# Patient Record
Sex: MALE | Race: WHITE | NOT HISPANIC OR LATINO
[De-identification: names, ages, dates, MRNs, and addresses within clinical notes are randomized per-mention and may not be internally consistent; named-entity substitution may affect disease eponyms.]

---

## 2019-07-25 PROBLEM — Z00.00 ENCOUNTER FOR PREVENTIVE HEALTH EXAMINATION: Status: ACTIVE | Noted: 2019-07-25

## 2019-07-29 ENCOUNTER — APPOINTMENT (OUTPATIENT)
Dept: ORTHOPEDIC SURGERY | Facility: CLINIC | Age: 74
End: 2019-07-29
Payer: COMMERCIAL

## 2019-07-29 VITALS — BODY MASS INDEX: 23.22 KG/M2 | WEIGHT: 136 LBS | HEIGHT: 64 IN | RESPIRATION RATE: 16 BRPM

## 2019-07-29 DIAGNOSIS — Z87.891 PERSONAL HISTORY OF NICOTINE DEPENDENCE: ICD-10-CM

## 2019-07-29 DIAGNOSIS — S69.92XD UNSPECIFIED INJURY OF LEFT WRIST, HAND AND FINGER(S), SUBSEQUENT ENCOUNTER: ICD-10-CM

## 2019-07-29 DIAGNOSIS — Z87.438 PERSONAL HISTORY OF OTHER DISEASES OF MALE GENITAL ORGANS: ICD-10-CM

## 2019-07-29 PROCEDURE — 73110 X-RAY EXAM OF WRIST: CPT | Mod: 50

## 2019-07-29 PROCEDURE — 99203 OFFICE O/P NEW LOW 30 MIN: CPT

## 2019-07-30 ENCOUNTER — APPOINTMENT (OUTPATIENT)
Dept: ORTHOPEDIC SURGERY | Facility: CLINIC | Age: 74
End: 2019-07-30

## 2019-08-05 ENCOUNTER — OTHER (OUTPATIENT)
Age: 74
End: 2019-08-05

## 2019-08-05 ENCOUNTER — MOBILE ON CALL (OUTPATIENT)
Age: 74
End: 2019-08-05

## 2019-09-03 ENCOUNTER — APPOINTMENT (OUTPATIENT)
Dept: ORTHOPEDIC SURGERY | Facility: CLINIC | Age: 74
End: 2019-09-03
Payer: COMMERCIAL

## 2019-09-03 VITALS — HEIGHT: 64 IN | BODY MASS INDEX: 23.22 KG/M2 | RESPIRATION RATE: 16 BRPM | WEIGHT: 136 LBS

## 2019-09-03 DIAGNOSIS — S62.032D: ICD-10-CM

## 2019-09-03 PROCEDURE — 99214 OFFICE O/P EST MOD 30 MIN: CPT

## 2019-09-03 PROCEDURE — 73110 X-RAY EXAM OF WRIST: CPT | Mod: LT

## 2019-09-17 ENCOUNTER — OTHER (OUTPATIENT)
Age: 74
End: 2019-09-17

## 2019-09-17 ENCOUNTER — MOBILE ON CALL (OUTPATIENT)
Age: 74
End: 2019-09-17

## 2019-10-01 ENCOUNTER — MOBILE ON CALL (OUTPATIENT)
Age: 74
End: 2019-10-01

## 2019-11-06 ENCOUNTER — RX RENEWAL (OUTPATIENT)
Age: 74
End: 2019-11-06

## 2020-05-12 ENCOUNTER — APPOINTMENT (OUTPATIENT)
Dept: UROLOGY | Facility: CLINIC | Age: 75
End: 2020-05-12

## 2020-09-08 ENCOUNTER — APPOINTMENT (OUTPATIENT)
Dept: UROLOGY | Facility: CLINIC | Age: 75
End: 2020-09-08
Payer: COMMERCIAL

## 2020-09-08 VITALS — DIASTOLIC BLOOD PRESSURE: 74 MMHG | SYSTOLIC BLOOD PRESSURE: 137 MMHG | TEMPERATURE: 97.5 F

## 2020-09-08 PROCEDURE — 99213 OFFICE O/P EST LOW 20 MIN: CPT

## 2020-09-08 NOTE — PHYSICAL EXAM
[General Appearance - Well Nourished] : well nourished [General Appearance - Well Developed] : well developed [Normal Appearance] : normal appearance [Well Groomed] : well groomed [General Appearance - In No Acute Distress] : no acute distress [Heart Rate And Rhythm] : Heart rate and rhythm were normal [] : no respiratory distress [Abdomen Soft] : soft [Abdomen Tenderness] : non-tender [Costovertebral Angle Tenderness] : no ~M costovertebral angle tenderness [Normal Station and Gait] : the gait and station were normal for the patient's age [No Focal Deficits] : no focal deficits [Skin Color & Pigmentation] : normal skin color and pigmentation [Oriented To Time, Place, And Person] : oriented to person, place, and time [Affect] : the affect was normal [Mood] : the mood was normal [Not Anxious] : not anxious [Inguinal Lymph Nodes Enlarged Bilaterally] : inguinal [Urethral Meatus] : meatus normal [Penis Abnormality] : normal circumcised penis [Scrotum] : the scrotum was normal [Testes Tenderness] : no tenderness of the testes [Epididymis] : the epididymides were normal [Testes Mass (___cm)] : there were no testicular masses [Rectal Exam - Rectum] : digital rectal exam was normal [Prostate Enlargement] : the prostate was not enlarged [Prostate Tenderness] : the prostate was not tender [Prostate Size ___ gm] : prostate size [unfilled] gm

## 2020-09-09 ENCOUNTER — APPOINTMENT (OUTPATIENT)
Dept: UROLOGY | Facility: CLINIC | Age: 75
End: 2020-09-09

## 2020-09-09 LAB
APPEARANCE: CLEAR
BACTERIA: NEGATIVE
BILIRUBIN URINE: NEGATIVE
BLOOD URINE: NEGATIVE
COLOR: YELLOW
GLUCOSE QUALITATIVE U: NEGATIVE
HYALINE CASTS: 0 /LPF
KETONES URINE: NEGATIVE
LEUKOCYTE ESTERASE URINE: NEGATIVE
MICROSCOPIC-UA: NORMAL
NITRITE URINE: NEGATIVE
PH URINE: 5.5
PROTEIN URINE: NEGATIVE
RED BLOOD CELLS URINE: 2 /HPF
SPECIFIC GRAVITY URINE: 1.03
SQUAMOUS EPITHELIAL CELLS: 0 /HPF
UROBILINOGEN URINE: NORMAL
WHITE BLOOD CELLS URINE: 0 /HPF

## 2020-09-09 NOTE — HISTORY OF PRESENT ILLNESS
[FreeTextEntry1] : Dear Dr. Casi Abdullahi\par \par Thank you so much for the referral to help care for your patient.\par \par Chief Complaint: BPH with HOUSTON\par Date of first visit: 09/08/2020\par \par RADHA HOANG  is a 75 year old  man, who presents for annual follow up.\par \par He is a prior patient of Dr. Abdullahi, he was seen last in 2019.\par \par He has moderate BPH, and he continues on Tamsulosin 0.4 mg, now has been for 2 yrs, effective\par Nocturia x 1, daytime is stable, easier during the day.\par \par Hx of elevated PSAs with prior negative prostate biopsies, all negative.\par \par PSA Hx:\par 2.65 06/2019\par 5.4 02/2018\par 2.78  09/2016\par \par PCA3: 62 in 2016, 59 in 2019. 4K 1%\par Flow 09/08/20: Peak 13.7 ml/s, Ave 6.1 ml/s,  mL. PVR 4 mL.\par \par 09/06/2020 \par IPSS 3 QOL 3 \par LIOR 1\par \par The patient denies fevers, chills, nausea and or vomiting and no unexplained weight loss. No UTIs or prostatitis.\par \par All pertinent laboratory, films and physician notes were reviewed.  Questionnaire results were discussed with patient.

## 2020-09-09 NOTE — ASSESSMENT
[FreeTextEntry1] : In summary, 75 year old, , mild BPH on Tamsulosin 0.4 mg, worse urination in am, prior negative prostate biopsies, all negative, PSA 2.65 06/2019, PCA3: 59 in 2019. 4K 1%, Flow normal, PVR 4 mL\par \par 1. Continue Flomax 0.4 mg daily\par 2. Follow up in 6 months\par \par Thank you very much for allowing me to assist in the care of this patient. Should you have any additional questions or concerns please do not hesitate to contact me.\par \par Sincerely,\par \par Jj Finch D.O.\par  of Urology and Radiology\par  of Urology at [facility]\par System Director for Prostate Cancer\par [address] [place] [address], [address]\par Phone: [phone]\par

## 2020-09-10 LAB — BACTERIA UR CULT: NORMAL

## 2021-03-09 ENCOUNTER — APPOINTMENT (OUTPATIENT)
Dept: UROLOGY | Facility: CLINIC | Age: 76
End: 2021-03-09
Payer: COMMERCIAL

## 2021-03-09 VITALS — TEMPERATURE: 98.1 F | HEART RATE: 81 BPM | SYSTOLIC BLOOD PRESSURE: 118 MMHG | DIASTOLIC BLOOD PRESSURE: 67 MMHG

## 2021-03-09 PROCEDURE — 99072 ADDL SUPL MATRL&STAF TM PHE: CPT

## 2021-03-09 PROCEDURE — 99213 OFFICE O/P EST LOW 20 MIN: CPT

## 2021-03-09 NOTE — HISTORY OF PRESENT ILLNESS
[FreeTextEntry1] : Dear Dr. Casi Abdullahi\par \par Thank you so much for the referral to help care for your patient.\par \par Chief Complaint: BPH with HOUSTON\par Date of first visit: 09/08/2020\par \par RADHA HOANG  is a 75 year old  man with Hx of BPH with elevated PSA and prior negative prostate biopsies. He has moderate BPH and continues to take Flomax 0.4mg with worsening symptoms. His biggest complaint is weak FOS. Nocturia x1, + freq, urgency, intermittency. \par \par Also complaining of ED. He has no cardiac history and reports he still has weak morning erections.\par \par PSA Hx:\par 2.65 06/2019\par 5.4 02/2018\par 2.78  09/2016\par \par PCA3: 62 in 2016, 59 in 2019. 4K 1%\par Flow 09/08/20: Peak 13.7 ml/s, Ave 6.1 ml/s,  mL. PVR 4 mL.\par Flow 3/9/21: Peak 11.3 ml/s, avg 5.5 ml/s, vv 69cc. bell shaped curve. PVR 0mL\par \par 3/9/2021 \par IPSS 8  QOL 2\par LIOR 2\par \par 09/06/2020 \par IPSS 3 QOL 3 \par LIOR 1\par \par The patient denies fevers, chills, nausea and or vomiting and no unexplained weight loss. No UTIs or prostatitis.\par \par All pertinent laboratory, films and physician notes were reviewed.  Questionnaire results were discussed with patient. [Dysuria] : no dysuria [Hematuria - Gross] : no gross hematuria

## 2021-03-09 NOTE — PHYSICAL EXAM
[General Appearance - Well Developed] : well developed [General Appearance - Well Nourished] : well nourished [Normal Appearance] : normal appearance [Well Groomed] : well groomed [General Appearance - In No Acute Distress] : no acute distress [Abdomen Soft] : soft [Abdomen Tenderness] : non-tender [Costovertebral Angle Tenderness] : no ~M costovertebral angle tenderness [Urethral Meatus] : meatus normal [Penis Abnormality] : normal circumcised penis [Scrotum] : the scrotum was normal [Epididymis] : the epididymides were normal [Testes Tenderness] : no tenderness of the testes [Testes Mass (___cm)] : there were no testicular masses [Rectal Exam - Rectum] : digital rectal exam was normal [Prostate Enlargement] : the prostate was not enlarged [Prostate Tenderness] : the prostate was not tender [Prostate Size ___ gm] : prostate size [unfilled] gm [Skin Color & Pigmentation] : normal skin color and pigmentation [] : no respiratory distress [Oriented To Time, Place, And Person] : oriented to person, place, and time [Affect] : the affect was normal [Mood] : the mood was normal [Not Anxious] : not anxious [Normal Station and Gait] : the gait and station were normal for the patient's age [No Focal Deficits] : no focal deficits [Inguinal Lymph Nodes Enlarged Bilaterally] : inguinal [Edema] : no peripheral edema

## 2021-03-09 NOTE — ASSESSMENT
[FreeTextEntry1] : In summary, 75 year old, , mild BPH on Tamsulosin 0.4 mg with worsening urinary symptoms, prior negative prostate biopsies, all negative, PSA 2.65 06/2019, PCA3: 59 in 2019. 4K 1%, Flow normal, PVR 0 mL. Also complaining of ED.\par \par 1. Switch Flomax to Uroxatral\par 2. UA, UCx. Pt will drop off urine later today\par 3. Viagra sent to [facility]. Pt instructed to separate alpha blocker and sildenafil by 4-6 hours.\par 4. Follow up 1 month\par \par Based on the patient's history, he was prescribed Sildenafil\par \par The patient understands that the risks of this medication include facial redness, flushing, GERD, back pain, priapism, chest pain/MI, dizziness, drop in BP, loss of vision, blurry vision and loss of hearing. He has no history of unstable angina, SOB on exertion and or chest pain.\par \par The patient has been screened for potential medication interactions. He is not on any po antifungals or HIV meds. \par \par I recommended that the patient take the first dose by himself. He knows that the medication may take up to 45 minutes to work (or longer on a full stomach) and requires sexual stimulation. He will come to the ER for priapism, chest pain, or loss of vision or hearing. The patient understood all of these instructions. He will follow up in 1 month\par \par Prescription was sent to the pharmacy. \par  \par Take pill only on days when you want an erection. The pill should be taken at least an hour prior to attempting to initiating erections. Avoid food for an hour before and after taking the pill, since food will interfere with absorption and the pill will be less effective. Once the pill is working, you have a window of 10-12 hours during which it should help you but only if you are sexually stimulated. The most common side effects (not experienced by everyone) are headache, facial flushing, sensation that your heart is pounding, nasal congestion, and blue-tinted vision. You can take an ibuprofen if needed. These side effects are self-limiting and will pass. Most men who have side effects notice that they significantly diminish with repeated use of the medication. If you have an erection lasting 2 hours (in the absence of sexual stimulation) take over the counter sudafed to counteract it. At 3 hours, you should head to the Emergency Department. \par \par \par Thank you very much for allowing me to assist in the care of this patient. Should you have any additional questions or concerns please do not hesitate to contact me.\par \par Sincerely,\par \par Jj Finch D.O.\par  of Urology and Radiology\par  of Urology at [facility]\par System Director for Prostate Cancer\par [address] [place] [address], [address]\par Phone: [phone]\par

## 2021-03-10 LAB
APPEARANCE: CLEAR
BACTERIA: NEGATIVE
BILIRUBIN URINE: NEGATIVE
BLOOD URINE: NEGATIVE
COLOR: YELLOW
GLUCOSE QUALITATIVE U: NEGATIVE
HYALINE CASTS: 1 /LPF
KETONES URINE: NEGATIVE
LEUKOCYTE ESTERASE URINE: NEGATIVE
MICROSCOPIC-UA: NORMAL
NITRITE URINE: NEGATIVE
PH URINE: 5.5
PROTEIN URINE: NEGATIVE
RED BLOOD CELLS URINE: 1 /HPF
SPECIFIC GRAVITY URINE: 1.03
SQUAMOUS EPITHELIAL CELLS: 0 /HPF
UROBILINOGEN URINE: NORMAL
WHITE BLOOD CELLS URINE: 0 /HPF

## 2021-03-11 ENCOUNTER — NON-APPOINTMENT (OUTPATIENT)
Age: 76
End: 2021-03-11

## 2021-03-11 LAB — BACTERIA UR CULT: NORMAL

## 2021-04-19 ENCOUNTER — APPOINTMENT (OUTPATIENT)
Dept: UROLOGY | Facility: CLINIC | Age: 76
End: 2021-04-19
Payer: COMMERCIAL

## 2021-04-19 ENCOUNTER — APPOINTMENT (OUTPATIENT)
Dept: UROLOGY | Facility: CLINIC | Age: 76
End: 2021-04-19

## 2021-04-19 VITALS — TEMPERATURE: 97.8 F | SYSTOLIC BLOOD PRESSURE: 115 MMHG | HEART RATE: 75 BPM | DIASTOLIC BLOOD PRESSURE: 72 MMHG

## 2021-04-19 PROCEDURE — 99072 ADDL SUPL MATRL&STAF TM PHE: CPT

## 2021-04-19 PROCEDURE — 99213 OFFICE O/P EST LOW 20 MIN: CPT

## 2021-04-19 RX ORDER — TAMSULOSIN HYDROCHLORIDE 0.4 MG/1
0.4 CAPSULE ORAL
Qty: 90 | Refills: 0 | Status: COMPLETED | COMMUNITY
End: 2021-04-19

## 2021-04-19 NOTE — ASSESSMENT
[FreeTextEntry1] : In summary, 75 year old, , mild BPH on Uroxtral x1 month after changing from Flomax with slight improvement. Prior negative prostate biopsies-all negative, PSA 2.65 06/2019, PCA3: 59 in 2019. 4K 1%, Flow normal, PVR 8 mL. \par \par 1. Continue Uroxatral\par 2. Continue Viagra. Pt instructed to separate alpha blocker and sildenafil by 4-6 hours.\par 3. Follow up 6 months\par \par Thank you very much for allowing me to assist in the care of this patient. Should you have any additional questions or concerns please do not hesitate to contact me.\par \par Sincerely,\par \par Jj Finch D.O.\par  of Urology and Radiology\par  of Urology at [facility]\par System Director for Prostate Cancer\par [address] [place] [address], [address]\par Phone: [phone]\par

## 2021-04-19 NOTE — PHYSICAL EXAM
[General Appearance - Well Developed] : well developed [General Appearance - Well Nourished] : well nourished [Normal Appearance] : normal appearance [Well Groomed] : well groomed [General Appearance - In No Acute Distress] : no acute distress [Abdomen Soft] : soft [Abdomen Tenderness] : non-tender [Costovertebral Angle Tenderness] : no ~M costovertebral angle tenderness [Urethral Meatus] : meatus normal [Penis Abnormality] : normal circumcised penis [Scrotum] : the scrotum was normal [Testes Tenderness] : no tenderness of the testes [Testes Mass (___cm)] : there were no testicular masses [Prostate Tenderness] : the prostate was not tender [Prostate Size ___ gm] : prostate size [unfilled] gm [Skin Color & Pigmentation] : normal skin color and pigmentation [Edema] : no peripheral edema [Oriented To Time, Place, And Person] : oriented to person, place, and time [Affect] : the affect was normal [Mood] : the mood was normal [Not Anxious] : not anxious [Normal Station and Gait] : the gait and station were normal for the patient's age [No Focal Deficits] : no focal deficits [Inguinal Lymph Nodes Enlarged Bilaterally] : inguinal [] : no hepato-splenomegaly [Abdomen Hernia] : no hernia was discovered [No Prostate Nodules] : no prostate nodules

## 2021-09-08 ENCOUNTER — APPOINTMENT (OUTPATIENT)
Dept: ORTHOPEDIC SURGERY | Facility: CLINIC | Age: 76
End: 2021-09-08
Payer: COMMERCIAL

## 2021-09-08 VITALS — WEIGHT: 136 LBS | HEIGHT: 64 IN | BODY MASS INDEX: 23.22 KG/M2 | RESPIRATION RATE: 16 BRPM

## 2021-09-08 PROCEDURE — 73140 X-RAY EXAM OF FINGER(S): CPT | Mod: F3

## 2021-09-08 PROCEDURE — 99213 OFFICE O/P EST LOW 20 MIN: CPT | Mod: 25

## 2021-09-08 PROCEDURE — 20550 NJX 1 TENDON SHEATH/LIGAMENT: CPT | Mod: F3

## 2021-09-08 NOTE — HISTORY OF PRESENT ILLNESS
[Ambidextrous] : ambidextrous [FreeTextEntry1] : Patient here for new complaint of left ring TF. Patient complains of pain, stiffness, and locking of the joint. Denies any numbness or tingling.\par

## 2021-09-08 NOTE — REVIEW OF SYSTEMS
[Ambidextrous] : ambidextrous [Arthralgia] : arthralgia [Joint Pain] : joint pain [Joint Stiffness] : joint stiffness [Joint Swelling] : joint swelling [Negative] : Allergic/Immunologic

## 2021-09-08 NOTE — PHYSICAL EXAM
[de-identified] : Physical exam shows the patient to be alert and oriented x3, capable of ambulation. The patient is well-developed and well-nourished in no apparent respiratory distress. Majority of the skin is intact bilaterally in the upper extremities without lymphadenopathy at the elbows.\par \par The wrists have a symmetric range of motion bilaterally. There is no tenderness over the scaphoid, scapholunate or lunotriquetral ligaments bilaterally. There is a negative Wills test bilaterally. There is negative tenderness over the radial ulnar joint or TFCC and no evidence of instability bilaterally. No tenderness over the pisotriquetral or hamate hook or CMC joint bilaterally. There is 5 over 5 strength of the wrists bilaterally.\par \par There is positive swelling and tenderness localized to the A1 pulley of the left fourth digit with locking upon compression of the pulley.. There is no evidence of infection. No tenderness of the MP, PIP or DIP joint and no evidence of instability bilaterally. The FDS FDP and extensor mechanism is intact without instability and with 5 over 5 strength bilaterally in the digits.\par \par There is good capillary refill of the digits bilaterally.There is no masses or sensitivity over the median and ulnar nerves at the level of the wrist. There is a negative Tinel's and negative Phalen's sign bilaterally. The sensation is grossly intact bilaterally. [de-identified] : PA lateral and oblique the left fourth digit shows excellent alignment with minimal degenerative changes and joint space is well-preserved

## 2021-09-08 NOTE — ASSESSMENT
[FreeTextEntry1] : Left fourth trigger finger\par \par My impression is that this patient has stenosing tenosynovitis of the left fourth finger, more comonly known as a trigger finger.  \par \par The risks benefits and alternatives of treatment were discussed ranging from conservative to aggressive forms of treatment.  This included (but was not limited to) pain, infection, subcutaneous atrophy, skin depigmentattion, tendon rupture, recurrence, incomplete relief of symptoms, tissue loss etc...  They agreed to undergo the steroid injection. \par \par Under informed consent and sterile conditions, 1/2 cc of 2% plain lidocaine  and 1/2 cc of Kenalog 10 was precisely injected into the patient's finger.   A sterile Band-Aid was placed and a home program was elected over occupational therapy. \par \par  It is my hope that this significantly alleviates the patient's symptoms. If symptoms are not fully resolved in the next 4-6 weeks they were encouraged to return to the office for reevaluation. Patient may also consider other forms of conservative care or surgical decompression which was discussed in the office today.\par \par  If symptoms are resolved they can followup on a as-needed basis.\par

## 2021-09-08 NOTE — REASON FOR VISIT
[Trigger Finger] : trigger finger [Follow-Up Visit] : a follow-up visit for [FreeTextEntry2] : Left ring

## 2021-10-18 ENCOUNTER — APPOINTMENT (OUTPATIENT)
Dept: UROLOGY | Facility: CLINIC | Age: 76
End: 2021-10-18
Payer: COMMERCIAL

## 2021-10-18 VITALS
TEMPERATURE: 98 F | BODY MASS INDEX: 23.22 KG/M2 | HEIGHT: 64 IN | HEART RATE: 73 BPM | SYSTOLIC BLOOD PRESSURE: 117 MMHG | WEIGHT: 136 LBS | DIASTOLIC BLOOD PRESSURE: 67 MMHG

## 2021-10-18 PROCEDURE — 99213 OFFICE O/P EST LOW 20 MIN: CPT

## 2021-10-21 NOTE — HISTORY OF PRESENT ILLNESS
[FreeTextEntry1] : Dear Dr. Casi Abdullahi\par \par Thank you so much for the referral to help care for your patient.\par \par Chief Complaint: BPH with HOUSTON, elevated PSA\par Date of first visit: 09/08/2020\par \par RADHA HOANG  is a 76 year old  man with Hx of BPH with elevated PSA and prior negative prostate biopsies. He has been on uroxatral for the past 7 months (switched from Flomax due to worsening BPH symptoms). Biggest complaint is still weak FOS-only with his first void in the morning. His last PSA is 2.6 ng/mL in 2019. He has had four negative biopsies, last one 2019. Denies family hx of prostate cancer. KRISTOPHER has been negative.\par \par Also complaining of ED. He has no cardiac history and reports he still has weak morning erections. Was started on Viagra and sees some improvement with this. \par \par PSA Hx:\par 2.65 06/2019\par 5.4 02/2018\par 2.78  09/2016\par \par PCA3: 62 in 2016, 59 in 2019. 4K 1%\par \par 10/18/2021\par IPSS 7 QOL 2\par LIOR 12\par Flow/PVR: Peak 12.1 ml/s, avg 7.2 ml/s, vv 63-not valid. PVR 0cc\par \par 4/19/2021\par IPSS 5 QOL 3\par LIOR 2\par Flow 4/19/21: Peak 14.6ml/s, avg 6.7 ml/s, vv 86mL. PVR 8 mL\par \par 3/9/2021 \par IPSS 8  QOL 2\par LIOR 2\par Flow 3/9/21: Peak 11.3 ml/s, avg 5.5 ml/s, vv 69cc. bell shaped curve. PVR 0mL\par \par 09/08/2020 \par IPSS 3 QOL 3 \par LIOR 1\par Flow 09/08/20: Peak 13.7 ml/s, Ave 6.1 ml/s,  mL. PVR 4 mL.\par \par Prostate cancer screening: the patient and I spoke at length about prostate cancer screening, its risks and its benefits. The patient has 2 (age, PSA) risk factors for prostate cancer.  He understands that many men with prostate cancer will die with the disease rather than of it and we also discussed the results large multi-center American and  prostate cancer screening trials. He also understands that PSA in and of itself does not diagnose prostate cancer but only assesses risk to a certain degree. The patient understands that to definitively screen for prostate cancer, a biopsy is required and this procedure has risks, including bleeding, infection, ED and urinary retention. The patient opted to stop screening for cancer given PSA <10 ng/mL, multiple negative biopsies, age group, no family hx.\par \par The patient denies fevers, chills, nausea and or vomiting and no unexplained weight loss. No UTIs or prostatitis.\par \par All pertinent laboratory, films and physician notes were reviewed.  Questionnaire results were discussed with patient.

## 2021-10-21 NOTE — ASSESSMENT
[FreeTextEntry1] : 75 yo male with BPH on uroxatral, ED on viagra, and hx of elevated PSAs and prior negative biopsies in the past.  PSA 2.65 06/2019, PCA3: 59 in 2019. 4K 1%, Flow not valid in office today but peak 12 ml/s, PVR 0 mL. He is happy.\par \par 1. Continue Uroxatral\par 2. Continue Viagra. Pt instructed to separate alpha blocker and sildenafil by 4-6 hours.\par 3. Follow up with Dr Abdullahi 6 months-1 year\par \par Follow up with us PRN\par \par Thank you very much for allowing me to assist in the care of this patient. Should you have any additional questions or concerns please do not hesitate to contact me.\par \par \par Sincerely,\par \par \par Jj Finch D.O.\par  of Urology and Radiology\par  of Urology at [facility]\par System Director for Prostate Cancer\par [address], [address] [facility], [address]\par Phone: [phone]\par \par

## 2021-10-21 NOTE — PHYSICAL EXAM
[General Appearance - Well Developed] : well developed [General Appearance - Well Nourished] : well nourished [Normal Appearance] : normal appearance [Well Groomed] : well groomed [General Appearance - In No Acute Distress] : no acute distress [Abdomen Soft] : soft [Abdomen Tenderness] : non-tender [Costovertebral Angle Tenderness] : no ~M costovertebral angle tenderness [Scrotum] : the scrotum was normal [Skin Color & Pigmentation] : normal skin color and pigmentation [] : no respiratory distress [Edema] : no peripheral edema [Oriented To Time, Place, And Person] : oriented to person, place, and time [Affect] : the affect was normal [Mood] : the mood was normal [Not Anxious] : not anxious [Normal Station and Gait] : the gait and station were normal for the patient's age [No Focal Deficits] : no focal deficits [Inguinal Lymph Nodes Enlarged Bilaterally] : inguinal [Respiration, Rhythm And Depth] : normal respiratory rhythm and effort [Exaggerated Use Of Accessory Muscles For Inspiration] : no accessory muscle use [FreeTextEntry1] : neg KRISTOPHER 4/19/2021

## 2021-12-07 ENCOUNTER — APPOINTMENT (OUTPATIENT)
Dept: ORTHOPEDIC SURGERY | Facility: CLINIC | Age: 76
End: 2021-12-07
Payer: COMMERCIAL

## 2021-12-07 VITALS — WEIGHT: 136 LBS | HEIGHT: 64 IN | BODY MASS INDEX: 23.22 KG/M2 | RESPIRATION RATE: 16 BRPM

## 2021-12-07 PROCEDURE — 99213 OFFICE O/P EST LOW 20 MIN: CPT | Mod: 25

## 2021-12-07 PROCEDURE — 20550 NJX 1 TENDON SHEATH/LIGAMENT: CPT | Mod: F3

## 2022-01-31 ENCOUNTER — RX RENEWAL (OUTPATIENT)
Age: 77
End: 2022-01-31

## 2022-05-25 ENCOUNTER — RX RENEWAL (OUTPATIENT)
Age: 77
End: 2022-05-25

## 2022-07-14 ENCOUNTER — NON-APPOINTMENT (OUTPATIENT)
Age: 77
End: 2022-07-14

## 2022-07-18 ENCOUNTER — APPOINTMENT (OUTPATIENT)
Dept: ORTHOPEDIC SURGERY | Facility: CLINIC | Age: 77
End: 2022-07-18

## 2022-07-18 VITALS — HEIGHT: 64 IN | BODY MASS INDEX: 23.22 KG/M2 | WEIGHT: 136 LBS

## 2022-07-18 DIAGNOSIS — M65.342 TRIGGER FINGER, LEFT RING FINGER: ICD-10-CM

## 2022-07-18 PROCEDURE — 20550 NJX 1 TENDON SHEATH/LIGAMENT: CPT | Mod: F3

## 2022-07-18 PROCEDURE — 99213 OFFICE O/P EST LOW 20 MIN: CPT | Mod: 25

## 2022-09-28 ENCOUNTER — RX RENEWAL (OUTPATIENT)
Age: 77
End: 2022-09-28

## 2022-10-10 ENCOUNTER — APPOINTMENT (OUTPATIENT)
Dept: UROLOGY | Facility: CLINIC | Age: 77
End: 2022-10-10

## 2022-10-21 NOTE — ASSESSMENT
[FreeTextEntry1] : 75 yo male with BPH on uroxatral, ED on viagra, and hx of elevated PSAs and prior negative biopsies in the past.  PSA 2.65 06/2019, PCA3: 59 in 2019. 4K 1%, Flow not valid in office today but peak 12 ml/s, PVR 0 mL. He is happy.\par \par 1. Continue Uroxatral\par 2. Continue Viagra. Pt instructed to separate alpha blocker and sildenafil by 4-6 hours.\par 3. Follow up with Dr Abdullahi 6 months-1 year\par \par Follow up with us PRN\par

## 2022-10-21 NOTE — HISTORY OF PRESENT ILLNESS
[FreeTextEntry1] : Dear Dr. Casi Abdullahi\par \par Thank you so much for the referral to help care for your patient.\par \par Chief Complaint: BPH with HOUSTON, elevated PSA\par Date of first visit: 09/08/2020\par \par RADHA HOANG  is a 77 year old  man with Hx of BPH with elevated PSA and prior negative prostate biopsies. He takes uroxatral for BPH. Biggest complaint is still weak FOS-only with his first void in the morning. His last PSA is 2.6 ng/mL in 2019. He has had four negative biopsies, last one 2019. Denies family hx of prostate cancer. KRISTOPHER has been negative.\par \par Also complaining of ED. He has no cardiac history and reports he still has weak morning erections. Was started on Viagra and sees some improvement with this. \par \par PSA Hx:\par 2.65 06/2019\par 5.4 02/2018\par 2.78  09/2016\par \par PCA3: 62 in 2016, 59 in 2019. 4K 1%\par \par 10/24/2022\par IPSS    QOL\par LIOR\par Flow/PVR:\par \par 10/18/2021\par IPSS 7 QOL 2\par LIOR 12\par Flow/PVR: Peak 12.1 ml/s, avg 7.2 ml/s, vv 63-not valid. PVR 0cc\par \par 4/19/2021\par IPSS 5 QOL 3\par LIOR 2\par Flow 4/19/21: Peak 14.6ml/s, avg 6.7 ml/s, vv 86mL. PVR 8 mL\par \par 3/9/2021 \par IPSS 8  QOL 2\par LIOR 2\par Flow 3/9/21: Peak 11.3 ml/s, avg 5.5 ml/s, vv 69cc. bell shaped curve. PVR 0mL\par \par 09/08/2020 \par IPSS 3 QOL 3 \par LIOR 1\par Flow 09/08/20: Peak 13.7 ml/s, Ave 6.1 ml/s,  mL. PVR 4 mL.\par \par Prostate cancer screening: the patient and I spoke at length about prostate cancer screening, its risks and its benefits. The patient has 2 (age, PSA) risk factors for prostate cancer.  He understands that many men with prostate cancer will die with the disease rather than of it and we also discussed the results large multi-center American and  prostate cancer screening trials. He also understands that PSA in and of itself does not diagnose prostate cancer but only assesses risk to a certain degree. The patient understands that to definitively screen for prostate cancer, a biopsy is required and this procedure has risks, including bleeding, infection, ED and urinary retention. The patient opted to stop screening for cancer given PSA <10 ng/mL, multiple negative biopsies, age group, no family hx.\par \par The patient denies fevers, chills, nausea and or vomiting and no unexplained weight loss. No UTIs or prostatitis.\par \par All pertinent laboratory, films and physician notes were reviewed.  Questionnaire results were discussed with patient.

## 2022-10-24 ENCOUNTER — APPOINTMENT (OUTPATIENT)
Dept: UROLOGY | Facility: CLINIC | Age: 77
End: 2022-10-24

## 2022-11-09 ENCOUNTER — OFFICE (OUTPATIENT)
Dept: URBAN - METROPOLITAN AREA CLINIC 28 | Facility: CLINIC | Age: 77
Setting detail: OPHTHALMOLOGY
End: 2022-11-09
Payer: COMMERCIAL

## 2022-11-09 DIAGNOSIS — Z96.1: ICD-10-CM

## 2022-11-09 DIAGNOSIS — H16.221: ICD-10-CM

## 2022-11-09 PROCEDURE — 99024 POSTOP FOLLOW-UP VISIT: CPT | Performed by: OPHTHALMOLOGY

## 2022-11-09 ASSESSMENT — KERATOMETRY
OD_K2POWER_DIOPTERS: 45.50
OD_K1POWER_DIOPTERS: 45.50
OS_K2POWER_DIOPTERS: 44.00
OD_AXISANGLE_DEGREES: 090
OS_K1POWER_DIOPTERS: 42.00
OS_AXISANGLE_DEGREES: 114

## 2022-11-09 ASSESSMENT — REFRACTION_CURRENTRX
OS_SPHERE: +2.50
OS_OVR_VA: 20/
OS_CYLINDER: 0.00
OD_SPHERE: +2.50
OD_OVR_VA: 20/
OD_AXIS: 180
OD_CYLINDER: 0.00
OS_AXIS: 180

## 2022-11-09 ASSESSMENT — SPHEQUIV_DERIVED
OS_SPHEQUIV: 0.375
OD_SPHEQUIV: -0.375
OS_SPHEQUIV: 0

## 2022-11-09 ASSESSMENT — AXIALLENGTH_DERIVED
OS_AL: 23.6295
OS_AL: 23.777
OD_AL: 23.0177

## 2022-11-09 ASSESSMENT — REFRACTION_MANIFEST
OS_CYLINDER: -1.25
OD_VA1: 20/25+2 SLOW
OD_VA1: 20/30
OD_AXIS: 090
OD_CYLINDER: -0.50
OS_AXIS: 30
OD_SPHERE: PLANO
OS_VA1: 20/40
OD_CYLINDER: -1.00
OS_SPHERE: +1.00
OD_SPHERE: PLANO
OD_AXIS: 90

## 2022-11-09 ASSESSMENT — SUPERFICIAL PUNCTATE KERATITIS (SPK): OD_SPK: T

## 2022-11-09 ASSESSMENT — LID EXAM ASSESSMENTS
OS_MEIBOMITIS: 2+
OD_MEIBOMITIS: 2+
OS_COMMENTS: THICKENED LIDS OU UL COMMENTS

## 2022-11-09 ASSESSMENT — VISUAL ACUITY
OS_BCVA: 20/25-1
OD_BCVA: 20/25-2

## 2022-11-09 ASSESSMENT — REFRACTION_AUTOREFRACTION
OD_AXIS: 098
OD_SPHERE: 0.00
OD_CYLINDER: -0.75
OS_SPHERE: +0.75
OS_CYLINDER: -1.50
OS_AXIS: 035

## 2022-11-09 ASSESSMENT — CORNEAL PTERYGIUM: OS_PTERYGIUM: NASAL 2MM 1MM

## 2022-11-09 ASSESSMENT — LID POSITION - DERMATOCHALASIS
OS_DERMATOCHALASIS: LLL 2+
OD_DERMATOCHALASIS: RLL 2+

## 2022-11-09 ASSESSMENT — CONFRONTATIONAL VISUAL FIELD TEST (CVF)
OD_FINDINGS: FULL
OS_FINDINGS: FULL

## 2023-01-09 ENCOUNTER — APPOINTMENT (OUTPATIENT)
Dept: UROLOGY | Facility: CLINIC | Age: 78
End: 2023-01-09
Payer: COMMERCIAL

## 2023-01-09 VITALS
OXYGEN SATURATION: 96 % | HEART RATE: 65 BPM | TEMPERATURE: 98.5 F | DIASTOLIC BLOOD PRESSURE: 74 MMHG | SYSTOLIC BLOOD PRESSURE: 130 MMHG

## 2023-01-09 PROCEDURE — 99214 OFFICE O/P EST MOD 30 MIN: CPT

## 2023-01-09 RX ORDER — SILDENAFIL 20 MG/1
20 TABLET ORAL
Qty: 30 | Refills: 3 | Status: COMPLETED | COMMUNITY
Start: 2021-03-09 | End: 2023-01-09

## 2023-01-09 NOTE — ASSESSMENT
[FreeTextEntry1] : 77 yo male with BPH on uroxatral, ED on viagra, and hx of elevated PSAs and prior negative biopsies in the past.  PSA 2.65 06/2019, PCA3: 59 in 2019. 4K 1%.  Prostate exam ~ 60 cc\par \par patient reports symptoms stable and not improving. \par \par 1. Continue Uroxatral / daily cialis for BPH treatment - \par     The patient understands that this medication may cause dizziness, retrograde ejaculation or nasal congestion among other complications. I recommended that the patient take this medication at night. If the side effects become too bothersome, the medication can be discontinued.  The patient aware of added risk of fainting with meds.\par     We also reviewed risks of tadalafil regarding dosing and side effects, priapism, and muscle aches and to separate doses by > 6 hrs.  \par 2. stop viagra\par 3. 3 months PVR and flow - office US pelvic\par \par Thank you very much for allowing me to assist in the care of this patient. Should you have any additional questions or concerns please do not hesitate to contact me.\par \par \par Sincerely,\par \par \par Jj Finch D.O.\par  of Urology and Radiology\par  of Urology at [facility]\par System Director for Prostate Cancer\par [address], [address] [facility], [address]\par Phone: [phone]\par

## 2023-01-09 NOTE — HISTORY OF PRESENT ILLNESS
[FreeTextEntry1] : Chief Complaint: BPH with HOUSTON, elevated PSA\par Date of first visit: 09/08/2020\par \par RADHA HOANG  is a 77 year old  man with Hx of BPH with elevated PSA and prior negative prostate biopsies. He is taking uroxatral (switched from Flomax due to worsening BPH symptoms). Biggest complaint is still weak FOS-only with his first void in the morning. \par \par His last PSA is 2.6 ng/mL in 2019. He has had four negative biopsies, last one 2019. Denies family hx of prostate cancer. KRISTOPHER has been negative.\par \par Also complaining of ED. He has no cardiac history and reports he still has weak morning erections. Was started on Viagra and sees some improvement with this. \par \par PSA Hx:\par 2.65 06/2019\par 5.4 02/2018\par 2.78  09/2016\par \par PCA3: 62 in 2016, 59 in 2019. 4K 1%\par \par 01/09/2023\par IPSS 11 QOL 3\par SHIM2- NSA\par Flow/PVR: PVR \par \par 10/18/2021\par IPSS 7 QOL 2\par LIOR 12\par Flow/PVR: Peak 12.1 ml/s, avg 7.2 ml/s, vv 63-not valid. PVR 0cc\par \par 4/19/2021\par IPSS 5 QOL 3\par LIOR 2\par Flow 4/19/21: Peak 14.6ml/s, avg 6.7 ml/s, vv 86mL. PVR 8 mL\par \par 3/9/2021 \par IPSS 8  QOL 2\par LIOR 2\par Flow 3/9/21: Peak 11.3 ml/s, avg 5.5 ml/s, vv 69cc. bell shaped curve. PVR 0mL\par \par 09/08/2020 \par IPSS 3 QOL 3 \par LIOR 1\par Flow 09/08/20: Peak 13.7 ml/s, Ave 6.1 ml/s,  mL. PVR 4 mL.\par \par Prostate cancer screening: the patient and I spoke at length about prostate cancer screening, its risks and its benefits. The patient has 2 (age, PSA) risk factors for prostate cancer.  He understands that many men with prostate cancer will die with the disease rather than of it and we also discussed the results large multi-center American and  prostate cancer screening trials. He also understands that PSA in and of itself does not diagnose prostate cancer but only assesses risk to a certain degree. The patient understands that to definitively screen for prostate cancer, a biopsy is required and this procedure has risks, including bleeding, infection, ED and urinary retention. The patient opted to stop screening for cancer given PSA <10 ng/mL, multiple negative biopsies, age group, no family hx.\par \par The patient denies fevers, chills, nausea and or vomiting and no unexplained weight loss. No UTIs or prostatitis.\par \par All pertinent laboratory, films and physician notes were reviewed.  Questionnaire results were discussed with patient.

## 2023-01-09 NOTE — PHYSICAL EXAM
[General Appearance - Well Developed] : well developed [Urethral Meatus] : meatus normal [Penis Abnormality] : normal circumcised penis [Testes Tenderness] : no tenderness of the testes [Testes Mass (___cm)] : there were no testicular masses [No Prostate Nodules] : no prostate nodules [Prostate Size ___ gm] : prostate size [unfilled] gm [Edema] : no peripheral edema [Oriented To Time, Place, And Person] : oriented to person, place, and time [Not Anxious] : not anxious [Sensation] : the sensory exam was normal to light touch and pinprick

## 2023-01-10 LAB
APPEARANCE: CLEAR
BACTERIA: NEGATIVE
BILIRUBIN URINE: NEGATIVE
BLOOD URINE: NEGATIVE
COLOR: YELLOW
GLUCOSE QUALITATIVE U: NEGATIVE
HYALINE CASTS: 0 /LPF
KETONES URINE: NEGATIVE
LEUKOCYTE ESTERASE URINE: NEGATIVE
MICROSCOPIC-UA: NORMAL
NITRITE URINE: NEGATIVE
PH URINE: 6
PROTEIN URINE: NORMAL
RED BLOOD CELLS URINE: 3 /HPF
SPECIFIC GRAVITY URINE: 1.02
SQUAMOUS EPITHELIAL CELLS: 0 /HPF
UROBILINOGEN URINE: NORMAL
WHITE BLOOD CELLS URINE: 0 /HPF

## 2023-01-11 LAB — BACTERIA UR CULT: NORMAL

## 2023-03-20 ENCOUNTER — RX RENEWAL (OUTPATIENT)
Age: 78
End: 2023-03-20

## 2023-04-10 ENCOUNTER — APPOINTMENT (OUTPATIENT)
Dept: UROLOGY | Facility: CLINIC | Age: 78
End: 2023-04-10
Payer: COMMERCIAL

## 2023-04-10 VITALS
HEART RATE: 74 BPM | BODY MASS INDEX: 23.22 KG/M2 | HEIGHT: 64 IN | SYSTOLIC BLOOD PRESSURE: 129 MMHG | OXYGEN SATURATION: 95 % | DIASTOLIC BLOOD PRESSURE: 79 MMHG | TEMPERATURE: 97.9 F | WEIGHT: 136 LBS

## 2023-04-10 PROCEDURE — 76857 US EXAM PELVIC LIMITED: CPT

## 2023-04-10 PROCEDURE — 99213 OFFICE O/P EST LOW 20 MIN: CPT

## 2023-04-10 NOTE — ASSESSMENT
[FreeTextEntry1] : 78 yo male with BPH and ED, and hx of elevated PSAs and prior negative biopsies in the past. \par \par BPH/LUTS\par - Continue Uroxatral / daily cialis for BPH treatment. He is happy\par - Flow/PVRs at visits\par - In office US 4/10/23- 86cc prostate. prevoid volume 130cc. PVR 2cc\par    \par Erectile Dysfunction\par - Continue daily Cialis\par \par \par Follow up 6 months\par \par

## 2023-04-10 NOTE — HISTORY OF PRESENT ILLNESS
[FreeTextEntry1] : Chief Complaint: BPH with HOUSTON, elevated PSA\par Date of first visit: 09/08/2020\par \par RADHA HOANG  is a 77 year old  man with Hx of BPH with elevated PSA and prior negative prostate biopsies. He is taking uroxatral (switched from Flomax due to worsening BPH symptoms). He is now on dual therapy and is happy. Strong flow and feels empty. Denies dysuria or hematuria.\par \par His last PSA is 2.6 ng/mL in 2019. He has had four negative biopsies, last one 2019. Denies family hx of prostate cancer. KRISTOPHER has been negative.\par \par Also complaining of ED. He has no cardiac history and reports he still has weak morning erections. Was started on Viagra and sees some improvement with this. Now on daily cialis for BPH and seeing some improvement with ED as well.\par \par PSA Hx:\par 2.65 06/2019\par 5.4 02/2018\par 2.78  09/2016\par \par PCA3: 62 in 2016, 59 in 2019. 4K 1%\par \par 04/10/2023\par IPSS 7 QOL 2\par LIOR 11\par Flow/PVR: Peak 16.9 ml/s, avg 8.2 ml/s, vv 141cc. PVR 2cc\par \par 01/09/2023\par IPSS 11 QOL 3\par SHIM2- NSA\par \par 10/18/2021\par IPSS 7 QOL 2\par LIOR 12\par Flow/PVR: Peak 12.1 ml/s, avg 7.2 ml/s, vv 63-not valid. PVR 0cc\par \par 4/19/2021\par IPSS 5 QOL 3\par LIOR 2\par Flow 4/19/21: Peak 14.6ml/s, avg 6.7 ml/s, vv 86mL. PVR 8 mL\par \par 3/9/2021 \par IPSS 8  QOL 2\par LIOR 2\par Flow 3/9/21: Peak 11.3 ml/s, avg 5.5 ml/s, vv 69cc. bell shaped curve. PVR 0mL\par \par 09/08/2020 \par IPSS 3 QOL 3 \par LIOR 1\par Flow 09/08/20: Peak 13.7 ml/s, Ave 6.1 ml/s,  mL. PVR 4 mL.\par \par Prostate cancer screening: the patient and I spoke at length about prostate cancer screening, its risks and its benefits. The patient has 2 (age, PSA) risk factors for prostate cancer.  He understands that many men with prostate cancer will die with the disease rather than of it and we also discussed the results large multi-center American and  prostate cancer screening trials. He also understands that PSA in and of itself does not diagnose prostate cancer but only assesses risk to a certain degree. The patient understands that to definitively screen for prostate cancer, a biopsy is required and this procedure has risks, including bleeding, infection, ED and urinary retention. The patient opted to stop screening for cancer given PSA <10 ng/mL, multiple negative biopsies, age group, no family hx.\par \par The patient denies fevers, chills, nausea and or vomiting and no unexplained weight loss. No UTIs or prostatitis.\par \par All pertinent laboratory, films and physician notes were reviewed.  Questionnaire results were discussed with patient.

## 2023-04-10 NOTE — ADDENDUM
[FreeTextEntry1] : I, Dr. Finch, personally performed the evaluation and management (E/M) services for this established patient who presents today with (a) new problem(s)/exacerbation of (an) existing condition(s).  That E/M includes conducting the examination, assessing all new/exacerbated conditions, and establishing a new plan of care.  Today, my ACP, Sarah Garcia, was here to observe my evaluation and management services for this new problem/exacerbated condition to be followed going forward.\par

## 2023-05-22 ENCOUNTER — RX ONLY (RX ONLY)
Age: 78
End: 2023-05-22

## 2023-05-22 ENCOUNTER — OFFICE (OUTPATIENT)
Dept: URBAN - METROPOLITAN AREA CLINIC 28 | Facility: CLINIC | Age: 78
Setting detail: OPHTHALMOLOGY
End: 2023-05-22
Payer: COMMERCIAL

## 2023-05-22 VITALS — WEIGHT: 136 LBS | HEIGHT: 64 IN

## 2023-05-22 DIAGNOSIS — H02.835: ICD-10-CM

## 2023-05-22 DIAGNOSIS — H43.393: ICD-10-CM

## 2023-05-22 DIAGNOSIS — H02.89: ICD-10-CM

## 2023-05-22 DIAGNOSIS — H16.221: ICD-10-CM

## 2023-05-22 DIAGNOSIS — H35.40: ICD-10-CM

## 2023-05-22 DIAGNOSIS — H11.042: ICD-10-CM

## 2023-05-22 DIAGNOSIS — H35.62: ICD-10-CM

## 2023-05-22 DIAGNOSIS — H02.821: ICD-10-CM

## 2023-05-22 DIAGNOSIS — H02.832: ICD-10-CM

## 2023-05-22 DIAGNOSIS — Z96.1: ICD-10-CM

## 2023-05-22 PROCEDURE — 92202 OPSCPY EXTND ON/MAC DRAW: CPT | Performed by: OPHTHALMOLOGY

## 2023-05-22 PROCEDURE — 92014 COMPRE OPH EXAM EST PT 1/>: CPT | Performed by: OPHTHALMOLOGY

## 2023-05-22 ASSESSMENT — REFRACTION_MANIFEST
OS_SPHERE: +1.00
OD_AXIS: 90
OD_VA1: 20/25+2 SLOW
OD_VA1: 20/30
OD_SPHERE: PLANO
OS_AXIS: 30
OD_SPHERE: PLANO
OS_VA1: 20/40
OS_CYLINDER: -1.25
OD_CYLINDER: -0.50
OD_CYLINDER: -1.00
OD_AXIS: 090

## 2023-05-22 ASSESSMENT — KERATOMETRY
OD_K1POWER_DIOPTERS: 45.50
OD_K2POWER_DIOPTERS: 45.75
OS_AXISANGLE_DEGREES: 106
OD_AXISANGLE_DEGREES: 039
OS_K2POWER_DIOPTERS: 44.00
OS_K1POWER_DIOPTERS: 42.00

## 2023-05-22 ASSESSMENT — REFRACTION_CURRENTRX
OD_OVR_VA: 20/
OD_AXIS: 180
OD_CYLINDER: 0.00
OS_SPHERE: +2.50
OD_SPHERE: +2.50
OS_CYLINDER: 0.00
OS_AXIS: 180
OS_OVR_VA: 20/

## 2023-05-22 ASSESSMENT — AXIALLENGTH_DERIVED
OS_AL: 23.777
OS_AL: 23.6295
OD_AL: 22.8821

## 2023-05-22 ASSESSMENT — LID EXAM ASSESSMENTS
OS_COMMENTS: THICKENED LIDS OU UL COMMENTS
OD_MEIBOMITIS: 2+
OS_MEIBOMITIS: 2+

## 2023-05-22 ASSESSMENT — REFRACTION_AUTOREFRACTION
OD_SPHERE: +0.25
OS_SPHERE: +0.75
OS_CYLINDER: -1.50
OS_AXIS: 034
OD_AXIS: 125
OD_CYLINDER: -0.75

## 2023-05-22 ASSESSMENT — SUPERFICIAL PUNCTATE KERATITIS (SPK)
OS_SPK: ABSENT
OD_SPK: T

## 2023-05-22 ASSESSMENT — LID POSITION - DERMATOCHALASIS
OS_DERMATOCHALASIS: LLL 2+
OD_DERMATOCHALASIS: RLL 2+

## 2023-05-22 ASSESSMENT — SPHEQUIV_DERIVED
OS_SPHEQUIV: 0
OD_SPHEQUIV: -0.125
OS_SPHEQUIV: 0.375

## 2023-05-22 ASSESSMENT — CONFRONTATIONAL VISUAL FIELD TEST (CVF)
OS_FINDINGS: FULL
OD_FINDINGS: FULL

## 2023-05-22 ASSESSMENT — VISUAL ACUITY
OD_BCVA: 20/25
OS_BCVA: 20/20

## 2023-05-22 ASSESSMENT — TONOMETRY
OD_IOP_MMHG: 10
OS_IOP_MMHG: 10

## 2023-05-22 ASSESSMENT — CORNEAL PTERYGIUM: OS_PTERYGIUM: NASAL 2MM 1MM

## 2023-07-17 ENCOUNTER — OFFICE (OUTPATIENT)
Dept: URBAN - METROPOLITAN AREA CLINIC 28 | Facility: CLINIC | Age: 78
Setting detail: OPHTHALMOLOGY
End: 2023-07-17
Payer: COMMERCIAL

## 2023-07-17 DIAGNOSIS — Z96.1: ICD-10-CM

## 2023-07-17 DIAGNOSIS — H35.40: ICD-10-CM

## 2023-07-17 DIAGNOSIS — H02.832: ICD-10-CM

## 2023-07-17 DIAGNOSIS — H35.62: ICD-10-CM

## 2023-07-17 DIAGNOSIS — H43.393: ICD-10-CM

## 2023-07-17 DIAGNOSIS — H16.221: ICD-10-CM

## 2023-07-17 DIAGNOSIS — H02.821: ICD-10-CM

## 2023-07-17 DIAGNOSIS — H11.042: ICD-10-CM

## 2023-07-17 DIAGNOSIS — H02.89: ICD-10-CM

## 2023-07-17 DIAGNOSIS — H02.835: ICD-10-CM

## 2023-07-17 PROCEDURE — 92014 COMPRE OPH EXAM EST PT 1/>: CPT | Performed by: OPHTHALMOLOGY

## 2023-07-17 ASSESSMENT — REFRACTION_MANIFEST
OD_CYLINDER: -0.50
OD_AXIS: 90
OS_AXIS: 30
OD_VA1: 20/25+2 SLOW
OD_SPHERE: PLANO
OD_CYLINDER: -1.00
OS_CYLINDER: -1.25
OD_SPHERE: PLANO
OS_VA1: 20/40
OD_AXIS: 090
OS_SPHERE: +1.00
OD_VA1: 20/30

## 2023-07-17 ASSESSMENT — REFRACTION_AUTOREFRACTION
OS_AXIS: 036
OD_CYLINDER: -1.25
OS_SPHERE: +0.50
OS_CYLINDER: -1.00
OD_AXIS: 100
OD_SPHERE: +1.00

## 2023-07-17 ASSESSMENT — AXIALLENGTH_DERIVED
OS_AL: 23.8237
OD_AL: 22.8285
OS_AL: 23.6757

## 2023-07-17 ASSESSMENT — TONOMETRY
OS_IOP_MMHG: 12
OD_IOP_MMHG: 12

## 2023-07-17 ASSESSMENT — CONFRONTATIONAL VISUAL FIELD TEST (CVF)
OD_FINDINGS: FULL
OS_FINDINGS: FULL

## 2023-07-17 ASSESSMENT — LID EXAM ASSESSMENTS
OS_MEIBOMITIS: 2+
OS_COMMENTS: THICKENED LIDS OU UL COMMENTS
OD_MEIBOMITIS: 2+

## 2023-07-17 ASSESSMENT — KERATOMETRY
OD_AXISANGLE_DEGREES: 014
OD_K1POWER_DIOPTERS: 45.00
OS_K2POWER_DIOPTERS: 44.00
OD_K2POWER_DIOPTERS: 45.50
OS_AXISANGLE_DEGREES: 112
OS_K1POWER_DIOPTERS: 41.75

## 2023-07-17 ASSESSMENT — REFRACTION_CURRENTRX
OD_SPHERE: +2.50
OS_CYLINDER: 0.00
OD_AXIS: 180
OS_SPHERE: +2.50
OD_OVR_VA: 20/
OS_AXIS: 180
OD_CYLINDER: 0.00
OS_OVR_VA: 20/

## 2023-07-17 ASSESSMENT — LID POSITION - DERMATOCHALASIS
OD_DERMATOCHALASIS: RLL 2+
OS_DERMATOCHALASIS: LLL 2+

## 2023-07-17 ASSESSMENT — SPHEQUIV_DERIVED
OS_SPHEQUIV: 0
OS_SPHEQUIV: 0.375
OD_SPHEQUIV: 0.375

## 2023-07-17 ASSESSMENT — CORNEAL PTERYGIUM: OS_PTERYGIUM: NASAL 2MM 1MM

## 2023-07-17 ASSESSMENT — VISUAL ACUITY
OD_BCVA: 20/20
OS_BCVA: 20/20

## 2023-10-09 ENCOUNTER — APPOINTMENT (OUTPATIENT)
Dept: UROLOGY | Facility: CLINIC | Age: 78
End: 2023-10-09
Payer: COMMERCIAL

## 2023-10-09 VITALS
SYSTOLIC BLOOD PRESSURE: 112 MMHG | OXYGEN SATURATION: 98 % | TEMPERATURE: 98.3 F | HEART RATE: 80 BPM | DIASTOLIC BLOOD PRESSURE: 66 MMHG

## 2023-10-09 DIAGNOSIS — N40.1 BENIGN PROSTATIC HYPERPLASIA WITH LOWER URINARY TRACT SYMPMS: ICD-10-CM

## 2023-10-09 DIAGNOSIS — N52.9 MALE ERECTILE DYSFUNCTION, UNSPECIFIED: ICD-10-CM

## 2023-10-09 DIAGNOSIS — R97.20 ELEVATED PROSTATE, SPECIFIC ANTIGEN [PSA]: ICD-10-CM

## 2023-10-09 PROCEDURE — 76857 US EXAM PELVIC LIMITED: CPT

## 2023-10-09 PROCEDURE — 99214 OFFICE O/P EST MOD 30 MIN: CPT

## 2023-10-09 RX ORDER — TADALAFIL 5 MG/1
5 TABLET ORAL
Qty: 90 | Refills: 3 | Status: ACTIVE | COMMUNITY
Start: 2023-01-09 | End: 1900-01-01

## 2024-02-13 ENCOUNTER — RX RENEWAL (OUTPATIENT)
Age: 79
End: 2024-02-13

## 2024-02-13 RX ORDER — ALFUZOSIN HYDROCHLORIDE 10 MG/1
10 TABLET, EXTENDED RELEASE ORAL
Qty: 90 | Refills: 0 | Status: ACTIVE | COMMUNITY
Start: 2021-03-09 | End: 1900-01-01

## 2024-09-30 ENCOUNTER — APPOINTMENT (OUTPATIENT)
Dept: UROLOGY | Facility: CLINIC | Age: 79
End: 2024-09-30

## 2024-10-02 ENCOUNTER — APPOINTMENT (OUTPATIENT)
Dept: UROLOGY | Facility: CLINIC | Age: 79
End: 2024-10-02
Payer: COMMERCIAL

## 2024-10-02 VITALS
SYSTOLIC BLOOD PRESSURE: 117 MMHG | OXYGEN SATURATION: 92 % | TEMPERATURE: 98.7 F | HEART RATE: 78 BPM | DIASTOLIC BLOOD PRESSURE: 66 MMHG

## 2024-10-02 DIAGNOSIS — N40.1 BENIGN PROSTATIC HYPERPLASIA WITH LOWER URINARY TRACT SYMPMS: ICD-10-CM

## 2024-10-02 DIAGNOSIS — N52.9 MALE ERECTILE DYSFUNCTION, UNSPECIFIED: ICD-10-CM

## 2024-10-02 PROCEDURE — 99213 OFFICE O/P EST LOW 20 MIN: CPT

## 2024-10-02 RX ORDER — TADALAFIL 5 MG/1
5 TABLET ORAL
Qty: 90 | Refills: 1 | Status: ACTIVE | COMMUNITY
Start: 2024-10-02 | End: 1900-01-01

## 2024-10-02 RX ORDER — ALFUZOSIN HYDROCHLORIDE 10 MG/1
10 TABLET, EXTENDED RELEASE ORAL
Qty: 90 | Refills: 0 | Status: ACTIVE | COMMUNITY
Start: 2024-10-02 | End: 1900-01-01

## 2024-10-02 NOTE — ASSESSMENT
[FreeTextEntry1] : 78 yo male with BPH and ED, and hx of elevated PSAs and prior negative biopsies in the past.  BPH/LUTS - Continue Uroxatral / daily cialis for BPH treatment.  Offered finasteride, patient deferred. Patient not too bothered by symptoms, not interested in changing regimen at this time. - Flow/PVRs at visits - In office US 10/9/23- 92cc prostate. unable to void, PVR 2cc - Follow-up 1 year, scripts/refill given - consider PAE if symptoms worsen  Erectile Dysfunction - Continue daily Cialis 5mg - Not interested in pursuing further evaluation at this time  Elevated PSA - Given age (77yo) and PSA trend, will stop trending at this time  Follow-up 1 year or prn  Thank you very much for allowing me to assist in the care of this patient. Please do not hesitate to contact me with any additional questions or concerns.     Sincerely,     Jj Finch D.O. Professor of Urology and Radiology  of Urology at [facility] Director for Prostate Cancer [address] [place] [address], [address] [place] [address] Phone: [phone]  I was present with the Resident during the key portions of the history and exam. I discussed the case with the Resident and agree with the findings and plan as documented in the Resident/Fellow 's note, unless noted below.

## 2024-10-02 NOTE — HISTORY OF PRESENT ILLNESS
[FreeTextEntry1] : Chief Complaint: BPH with [place]ST[place], elevated PSA Date of first visit: 09/08/2020  RADHA HOANG is a 79 year old  man with Hx of BPH with elevated PSA and prior negative prostate biopsies. He is taking Uroxatral (switched from Flomax due to worsening BPH symptoms). He is now on dual therapy and is happy. In office US (10/9/23) showed residual of 50cc, does not need to void (no flow), Prostate 92cc. Denies dysuria or hematuria. Notes that in AM, first void has sluggish flow, but after that all voids with strong flow. Nocturia 1x.  His last PSA is 2.6 ng/mL in 2019. He has had four negative biopsies, last one 2019. Denies family hx of prostate cancer. KRISTOPHER has been negative.  Also complaining of ED. He has no cardiac history and reports he still has weak morning erections. Was started on Viagra and sees some improvement with this. Now on daily Cialis for BPH and seeing some improvement with ED as well.  PSA Hx: 2.65 06/2019 5.4 02/2018 2.78 09/2016  PCA3: 62 in 2016, 59 in 2019. 4K 1%  10/02/2024 IPSS   16  QOL  3 SHIM10 Flow/PVR: max flow 16.2 ml/s, ave 6.9 ml/s, VV 81.4cc PVR 2cc   10/09/2023 IPSS 8 QOL 2 LIOR 6 Flow/PVR: no flow, bladder scan 50cc  04/10/2023 IPSS 7 QOL 2 LIOR 11 Flow/PVR: Peak 16.9 ml/s, avg 8.2 ml/s, vv 141cc. PVR 2cc  01/09/2023 IPSS 11 QOL 3 SHIM2- NSA  10/18/2021 IPSS 7 QOL 2 LIOR 12 Flow/PVR: Peak 12.1 ml/s, avg 7.2 ml/s, vv 63-not valid. PVR 0cc  4/19/2021 IPSS 5 QOL 3 LIOR 2 Flow 4/19/21: Peak 14.6ml/s, avg 6.7 ml/s, vv 86mL. PVR 8 mL  3/9/2021 IPSS 8 QOL 2 LIOR 2 Flow 3/9/21: Peak 11.3 ml/s, avg 5.5 ml/s, vv 69cc. bell shaped curve. PVR 0mL  09/08/2020 IPSS 3 QOL 3 LIOR 1 Flow 09/08/20: Peak 13.7 ml/s, Ave 6.1 ml/s,  mL. PVR 4 mL.  Prostate cancer screening: the patient and I spoke at length about prostate cancer screening, its risks and its benefits. The patient has 2 (age, PSA) risk factors for prostate cancer. He understands that many men with prostate cancer will die with the disease rather than of it and we also discussed the results large multi-center American and  prostate cancer screening trials. He also understands that PSA in and of itself does not diagnose prostate cancer but only assesses risk to a certain degree. The patient understands that to definitively screen for prostate cancer, a biopsy is required and this procedure has risks, including bleeding, infection, ED and urinary retention. The patient opted to stop screening for cancer given PSA <10 ng/mL, multiple negative biopsies, age group, no family hx.  The patient denies fevers, chills, nausea and or vomiting and no unexplained weight loss. No UTIs or prostatitis.  All pertinent laboratory, films and physician notes were reviewed. Questionnaire results were discussed with patient.

## 2024-10-02 NOTE — ASSESSMENT
[FreeTextEntry1] : 80 yo male with BPH and ED, and hx of elevated PSAs and prior negative biopsies in the past.  BPH/LUTS - Continue Uroxatral / daily cialis for BPH treatment.  Offered finasteride, patient deferred. Patient not too bothered by symptoms, not interested in changing regimen at this time. - Flow/PVRs at visits - In office US 10/9/23- 92cc prostate. unable to void, PVR 2cc - Follow-up 1 year, scripts/refill given - consider PAE if symptoms worsen  Erectile Dysfunction - Continue daily Cialis 5mg - Not interested in pursuing further evaluation at this time  Elevated PSA - Given age (77yo) and PSA trend, will stop trending at this time  Follow-up 1 year or prn  Thank you very much for allowing me to assist in the care of this patient. Please do not hesitate to contact me with any additional questions or concerns.     Sincerely,     Jj Finch D.O. Professor of Urology and Radiology  of Urology at [facility] Director for Prostate Cancer [address] [place] [address], [address] [place] [address] Phone: [phone]  I was present with the Resident during the key portions of the history and exam. I discussed the case with the Resident and agree with the findings and plan as documented in the Resident/Fellow 's note, unless noted below.

## 2024-10-02 NOTE — ADDENDUM
[FreeTextEntry1] :   I, Dr. Finch, personally performed the evaluation and management (E/M) services for this established patient who presents today with (a) new problem(s)/exacerbation of (an) existing condition(s).  That E/M includes conducting the examination, assessing all new/exacerbated conditions, and establishing a new plan of care.  Today, my ACP, Amina Chong, ANP-BC, was here to observe my evaluation and management services for this new problem/exacerbated condition to be followed going forward.